# Patient Record
Sex: FEMALE | URBAN - METROPOLITAN AREA
[De-identification: names, ages, dates, MRNs, and addresses within clinical notes are randomized per-mention and may not be internally consistent; named-entity substitution may affect disease eponyms.]

---

## 2021-05-27 ENCOUNTER — OFFICE VISIT (OUTPATIENT)
Dept: URBAN - METROPOLITAN AREA CLINIC 44 | Facility: CLINIC | Age: 41
End: 2021-05-27
Payer: COMMERCIAL

## 2021-05-27 DIAGNOSIS — H04.123 DRY EYE SYNDROME OF BILATERAL LACRIMAL GLANDS: Primary | ICD-10-CM

## 2021-05-27 DIAGNOSIS — G43.B0 OPHTHALMOPLEGIC MIGRAINE, NOT INTRACTABLE: ICD-10-CM

## 2021-05-27 DIAGNOSIS — H25.031 ANTERIOR SUBCAPSULAR POLAR AGE-RELATED CATARACT, RIGHT EYE: ICD-10-CM

## 2021-05-27 DIAGNOSIS — H52.4 PRESBYOPIA: ICD-10-CM

## 2021-05-27 PROCEDURE — 92004 COMPRE OPH EXAM NEW PT 1/>: CPT | Performed by: OPTOMETRIST

## 2021-05-27 ASSESSMENT — VISUAL ACUITY
OD: 20/20
OS: 20/20

## 2021-05-27 ASSESSMENT — KERATOMETRY
OS: 46.25
OD: 46.13

## 2021-05-27 ASSESSMENT — INTRAOCULAR PRESSURE
OD: 16
OS: 16

## 2021-05-27 NOTE — IMPRESSION/PLAN
Impression: Ophthalmoplegic migraine, not intractable: G43. B0. Plan: Daily migraine headaches w/aura since August 2020 after hysterectomy. Botox made migraines worse. She received nerve block injections yesterday which helped. Retina attached 360, ocular health wnl. Follow up with neurology.

## 2021-07-18 NOTE — IMPRESSION/PLAN
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 Plan: Mild congenital cataract. Not affecting vision. Monitor.

## 2023-01-18 ENCOUNTER — OFFICE VISIT (OUTPATIENT)
Dept: URBAN - METROPOLITAN AREA CLINIC 44 | Facility: CLINIC | Age: 43
End: 2023-01-18
Payer: COMMERCIAL

## 2023-01-18 DIAGNOSIS — H04.123 DRY EYE SYNDROME OF BILATERAL LACRIMAL GLANDS: ICD-10-CM

## 2023-01-18 DIAGNOSIS — H25.031 ANTERIOR SUBCAPSULAR POLAR AGE-RELATED CATARACT, RIGHT EYE: Primary | ICD-10-CM

## 2023-01-18 PROCEDURE — 99214 OFFICE O/P EST MOD 30 MIN: CPT | Performed by: OPTOMETRIST

## 2023-01-18 ASSESSMENT — INTRAOCULAR PRESSURE
OS: 16
OD: 16

## 2023-01-18 ASSESSMENT — KERATOMETRY
OD: 46.75
OS: 46.63

## 2023-01-18 ASSESSMENT — VISUAL ACUITY
OS: 20/20
OD: 20/25

## 2023-01-18 NOTE — IMPRESSION/PLAN
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 Mild congenital cataract. Visually significant/symptomatic cataracts. Patient reporting difficulty with hobbies. BCVA OD 20/25. Glare OD 20/60. Plan: PLAN: Discussed findings and reviewed treatment options. Rec CE/IOL to improve vision. Patient elects to proceed with surgical treatment. Risks and benefits to be discussed by surgeon. Recommend surgery OD. Patient candidate  for the following: Std IOL, toric, multifocal, LenSx, ORA. Refractive goal to be discussed with surgeon. Patient understands they may need correction to achieve best vision. BCVA maybe limited by DED.

## 2023-04-04 ENCOUNTER — ADULT PHYSICAL (OUTPATIENT)
Dept: URBAN - METROPOLITAN AREA CLINIC 44 | Facility: CLINIC | Age: 43
End: 2023-04-04
Payer: COMMERCIAL

## 2023-04-04 DIAGNOSIS — Z01.818 ENCOUNTER FOR OTHER PREPROCEDURAL EXAMINATION: Primary | ICD-10-CM

## 2023-04-04 DIAGNOSIS — H25.031 ANTERIOR SUBCAPSULAR POLAR AGE-RELATED CATARACT, RIGHT EYE: Primary | ICD-10-CM

## 2023-04-04 PROCEDURE — 92025 CPTRIZED CORNEAL TOPOGRAPHY: CPT | Performed by: OPHTHALMOLOGY

## 2023-04-04 PROCEDURE — 99203 OFFICE O/P NEW LOW 30 MIN: CPT | Performed by: PHYSICIAN ASSISTANT

## 2023-04-04 ASSESSMENT — PACHYMETRY
OS: 3.49
OD: 22.00
OS: 22.15
OD: 3.53

## 2023-04-19 ENCOUNTER — PRE-OPERATIVE VISIT (OUTPATIENT)
Dept: URBAN - METROPOLITAN AREA CLINIC 44 | Facility: CLINIC | Age: 43
End: 2023-04-19
Payer: COMMERCIAL

## 2023-04-19 DIAGNOSIS — H25.031 ANTERIOR SUBCAPSULAR POLAR AGE-RELATED CATARACT, RIGHT EYE: Primary | ICD-10-CM

## 2023-04-19 DIAGNOSIS — H52.221 REGULAR ASTIGMATISM, RIGHT EYE: ICD-10-CM

## 2023-04-19 PROCEDURE — 99204 OFFICE O/P NEW MOD 45 MIN: CPT | Performed by: OPHTHALMOLOGY

## 2023-04-19 ASSESSMENT — VISUAL ACUITY
OD: 20/25
OS: 20/20

## 2023-04-19 NOTE — IMPRESSION/PLAN
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 Plan: Discussed cataract diagnosis with the patient. Discussed and reviewed treatment options for cataracts. Surgical treatment is required for cataracts. Risks and benefits of surgical treatment were discussed and understood. Patient elects surgical treatment. Recommend surgery OD  PLAN STD LENS, DISTANCE AIM, NO UPGRADES, REVIEWED MARTA, PLAN LRI. PATIENT UNDERSTANDS THE NEED FOR GLASSES POST-OP FOR BEST OVER ALL VISION. Discussed limitations to vision post op due to  MIRNA . Chao Darden If first eye doing well, ok to proceed with second eye surgery . Chao Darden   INJECTABLE, DEXTENZA 1ST CHOICE, TRIMOXI 2ND

## 2023-04-26 ENCOUNTER — SURGERY (OUTPATIENT)
Dept: URBAN - METROPOLITAN AREA SURGERY 19 | Facility: SURGERY | Age: 43
End: 2023-04-26
Payer: COMMERCIAL

## 2023-04-26 DIAGNOSIS — H25.031 ANTERIOR SUBCAPSULAR POLAR AGE-RELATED CATARACT, RIGHT EYE: Primary | ICD-10-CM

## 2023-04-26 DIAGNOSIS — H52.221 REGULAR ASTIGMATISM, RIGHT EYE: ICD-10-CM

## 2023-04-26 PROCEDURE — 66984 XCAPSL CTRC RMVL W/O ECP: CPT | Performed by: OPHTHALMOLOGY

## 2023-04-27 ENCOUNTER — POST-OPERATIVE VISIT (OUTPATIENT)
Dept: URBAN - METROPOLITAN AREA CLINIC 44 | Facility: CLINIC | Age: 43
End: 2023-04-27
Payer: COMMERCIAL

## 2023-04-27 DIAGNOSIS — Z96.1 PRESENCE OF INTRAOCULAR LENS: Primary | ICD-10-CM

## 2023-04-27 PROCEDURE — 99024 POSTOP FOLLOW-UP VISIT: CPT | Performed by: OPTOMETRIST

## 2023-04-27 ASSESSMENT — INTRAOCULAR PRESSURE
OD: 16
OS: 16

## 2023-04-27 NOTE — IMPRESSION/PLAN
Impression: S/P Cataract Extraction by phacoemulsification with IOL placement; LRI (Limbal Relaxing Incision) OD - 1 Day. Presence of intraocular lens  Z96.1. Plan: Reviewed post-op progress. Continue with prescribed medications as directed. RTC 3-5 weeks for final post-op visit and refraction scheduled doctor. RTC sooner if decreased in vision or pain experienced.

## 2023-05-02 ENCOUNTER — POST-OPERATIVE VISIT (OUTPATIENT)
Dept: URBAN - METROPOLITAN AREA CLINIC 44 | Facility: CLINIC | Age: 43
End: 2023-05-02
Payer: COMMERCIAL

## 2023-05-02 DIAGNOSIS — Z48.810 ENCOUNTER FOR SURGICAL AFTERCARE FOLLOWING SURGERY ON A SENSE ORGAN: Primary | ICD-10-CM

## 2023-05-02 PROCEDURE — 99024 POSTOP FOLLOW-UP VISIT: CPT | Performed by: OPTOMETRIST

## 2023-05-02 RX ORDER — DICLOFENAC SODIUM 1 MG/ML
0.1 % SOLUTION/ DROPS OPHTHALMIC
Qty: 5 | Refills: 1 | Status: ACTIVE
Start: 2023-05-02

## 2023-05-02 RX ORDER — PREDNISOLONE ACETATE 10 MG/ML
1 % SUSPENSION/ DROPS OPHTHALMIC
Qty: 5 | Refills: 1 | Status: ACTIVE
Start: 2023-05-02

## 2023-05-02 ASSESSMENT — INTRAOCULAR PRESSURE
OS: 16
OD: 18

## 2023-05-02 NOTE — IMPRESSION/PLAN
Impression: S/P Cataract Extraction by phacoemulsification with IOL placement; LRI (Limbal Relaxing Incision) OD - 6 Days. Encounter for surgical aftercare following surgery on a sense organ  Z48.810.
ititis OD Plan: PLAN: Discussed findings with patient.   Start Pred and Diclofenac 1 drop QID for 1 week then TID 1 week BID 1 week and once a day one week and RTC as scheduled

## 2023-05-24 ENCOUNTER — POST-OPERATIVE VISIT (OUTPATIENT)
Dept: URBAN - METROPOLITAN AREA CLINIC 44 | Facility: CLINIC | Age: 43
End: 2023-05-24
Payer: COMMERCIAL

## 2023-05-24 DIAGNOSIS — Z48.810 ENCOUNTER FOR SURGICAL AFTERCARE FOLLOWING SURGERY ON A SENSE ORGAN: Primary | ICD-10-CM

## 2023-05-24 PROCEDURE — 99024 POSTOP FOLLOW-UP VISIT: CPT | Performed by: OPTOMETRIST

## 2023-05-24 ASSESSMENT — KERATOMETRY
OD: 46.75
OS: 46.63

## 2023-05-24 ASSESSMENT — VISUAL ACUITY
OD: 20/20
OS: 20/20

## 2023-05-24 ASSESSMENT — INTRAOCULAR PRESSURE
OS: 15
OD: 15

## 2023-05-24 NOTE — IMPRESSION/PLAN
Impression: S/P Cataract Extraction by phacoemulsification with IOL placement; LRI (Limbal Relaxing Incision) OD - 28 Days. Encounter for surgical aftercare following surgery on a sense organ  Z48.810. Plan: Defer RX. D/C Pred and Diclofenac (Patient reports possible side effects).  RTC 12 months for complete